# Patient Record
Sex: MALE | Race: WHITE | NOT HISPANIC OR LATINO | Employment: FULL TIME | URBAN - METROPOLITAN AREA
[De-identification: names, ages, dates, MRNs, and addresses within clinical notes are randomized per-mention and may not be internally consistent; named-entity substitution may affect disease eponyms.]

---

## 2021-04-23 DIAGNOSIS — R06.02 SHORTNESS OF BREATH: ICD-10-CM

## 2021-04-23 DIAGNOSIS — G60.0 CMT (CHARCOT-MARIE-TOOTH DISEASE): Primary | ICD-10-CM

## 2021-04-26 DIAGNOSIS — G60.0 CMT (CHARCOT-MARIE-TOOTH DISEASE): Primary | ICD-10-CM

## 2021-04-26 DIAGNOSIS — R06.02 SHORTNESS OF BREATH: ICD-10-CM

## 2021-04-28 ENCOUNTER — TELEPHONE (OUTPATIENT)
Dept: NEUROLOGY | Facility: CLINIC | Age: 41
End: 2021-04-28

## 2021-04-28 NOTE — TELEPHONE ENCOUNTER
[facility] Call Center    Phone Message    May a detailed message be left on voicemail: yes     Reason for Call: Appointment Intake    Referring Provider Name: Jefe Reyes MD   Diagnosis and/or Symptoms: CMT (Charcot-Shyanne-Tooth disease)  Shortness of breath     These diagnoses are not on our protocols for Neuro-Pulm. Please review and contact the patient to schedule.    Action Taken: Other: Neurology    Travel Screening: Not Applicable

## 2021-05-04 NOTE — TELEPHONE ENCOUNTER
Spoke with patient who reports issue with diaphragmatic weakness and SOB. Dr Saenz unavailable until August. Patient will call [facility] for possible earlier appt. Contact information provided.

## 2021-05-05 ENCOUNTER — TELEPHONE (OUTPATIENT)
Dept: NEUROLOGY | Facility: CLINIC | Age: 41
End: 2021-05-05

## 2021-05-05 NOTE — TELEPHONE ENCOUNTER
Voicemail received from [facility] requesting medical records related to referral for Jordan Flynn to see Dr. Saenz for paralyzed diaphragm.

## 2023-11-06 ENCOUNTER — MEDICAL CORRESPONDENCE (OUTPATIENT)
Dept: HEALTH INFORMATION MANAGEMENT | Facility: CLINIC | Age: 43
End: 2023-11-06
Payer: COMMERCIAL

## 2023-11-08 ENCOUNTER — TRANSCRIBE ORDERS (OUTPATIENT)
Dept: OTHER | Age: 43
End: 2023-11-08

## 2023-11-08 DIAGNOSIS — G60.0 PERONEAL MUSCULAR ATROPHY: ICD-10-CM

## 2023-11-08 DIAGNOSIS — G71.00 MUSCULAR DYSTROPHY, UNSPECIFIED (H): ICD-10-CM

## 2023-11-08 DIAGNOSIS — G62.9 NEUROPATHY: Primary | ICD-10-CM

## 2023-12-11 ENCOUNTER — OFFICE VISIT (OUTPATIENT)
Dept: NEUROLOGY | Facility: CLINIC | Age: 43
End: 2023-12-11
Attending: INTERNAL MEDICINE
Payer: COMMERCIAL

## 2023-12-11 VITALS — HEART RATE: 74 BPM | DIASTOLIC BLOOD PRESSURE: 79 MMHG | OXYGEN SATURATION: 97 % | SYSTOLIC BLOOD PRESSURE: 114 MMHG

## 2023-12-11 DIAGNOSIS — G60.0 PERONEAL MUSCULAR ATROPHY: ICD-10-CM

## 2023-12-11 DIAGNOSIS — G71.00 MUSCULAR DYSTROPHY, UNSPECIFIED (H): ICD-10-CM

## 2023-12-11 DIAGNOSIS — G60.0 CMT (CHARCOT-MARIE-TOOTH DISEASE): Primary | ICD-10-CM

## 2023-12-11 DIAGNOSIS — G62.9 NEUROPATHY: ICD-10-CM

## 2023-12-11 PROCEDURE — 99205 OFFICE O/P NEW HI 60 MIN: CPT | Performed by: STUDENT IN AN ORGANIZED HEALTH CARE EDUCATION/TRAINING PROGRAM

## 2023-12-11 RX ORDER — HYDROCHLOROTHIAZIDE 25 MG/1
1 TABLET ORAL DAILY
COMMUNITY
Start: 2023-10-23

## 2023-12-11 RX ORDER — MULTIVITAMIN WITH FOLIC ACID 400 MCG
1 TABLET ORAL DAILY
COMMUNITY
Start: 2022-09-01

## 2023-12-11 RX ORDER — FLUCONAZOLE 150 MG/1
TABLET ORAL
COMMUNITY
Start: 2023-10-04

## 2023-12-11 RX ORDER — LOSARTAN POTASSIUM 50 MG/1
1 TABLET ORAL DAILY
COMMUNITY
Start: 2023-10-04

## 2023-12-11 RX ORDER — BUSPIRONE HYDROCHLORIDE 30 MG/1
60 TABLET ORAL ONCE
COMMUNITY
Start: 2023-10-04

## 2023-12-11 NOTE — PROGRESS NOTES
CHIEF COMPLAINT / REASON FOR VISIT  Hereditary neuropathy, CMT2A.     Referred by Dr. Adame    HISTORY OF PRESENT ILLNESS   is a 43 year old male presenting [facility] for evaluation of  Hereditary neuropathy, CMT2A. He was seen in [id number] by Dr. Newell and [id number] by Dr. Reyes. Genetic test in June 2007 showed a single variant in MFN2 (c.653T>C, p.[id number]). The variant was reported as VUS in 2007, which is now classified as likely pathogenic. Similar variant was identified in his father, who had similar symptoms. He also had multiple other family members with similar neuropathy including his paternal grandmother, 4 our of 7 father's siblings, and his son.     He returns to neuromuscular clinic today for follow-up evaluation as well as to be evaluated for new electric wheelchair. The most significant change in the past 5 years was his respiratory status. He was given a diagnosis of partial diaphragm paralysis and has been using BIPAP at nighttime in the past 2 years. He has experienced significant benefit from this. He doesn't need BiPAP during the day. He continues to be wheelchair dependent 24/7.  He can transition by himself using his to lift himself up and then move sideway.  He has a conversion van and continues to be able to drive with hand control.   He continues to work full-time in [employer].  He is able to type, but has been making more mistakes lately.  He continues to have issues with pain in lower back and iliopsoas, especially with prolonged sitting in the wheelchair.  He spends 80% of his time reclining in bed and work from there.  His wheelchair elevating function has been broken multiple times this year.  He has had parts replaced and 3 repairs in the past 12 months, but still having frequent error with this function.  He is wearing compression socks to help with the swelling in the legs.  He takes gabapentin 2400 mg/day.    No issues with swallowing.  His weight has  been relatively stable.  He recently changed to bifocal glasses with improvement in his vision.    REVIEW OF SYSTEMS  All negative except for what indicated in the HPI. The following portions of the patient's history were reviewed and updated as appropriate: allergies, current medications, family history, medical history, surgical history, social history, and problem list.     PAST MEDICAL/SURGICAL HISTORY   CMT2A as stated above    MEDICATIONS    Current Outpatient Medications:     ALLOPURINOL PO, Take 100 mg by mouth daily, Disp: , Rfl:     Cholecalciferol (VITAMIN D3) 24098 UNIT CAPS, Take 1 tablet by mouth. Twice a week, Disp: , Rfl:     Cyanocobalamin (VITAMIN B 12 PO), Take 1,000 mcg by mouth , Disp: , Rfl:     DULOXETINE HCL PO, Take 20 mg by mouth daily, Disp: , Rfl:     gabapentin (NEURONTIN) 300 MG capsule, Take 300 mg by mouth 4 times daily , Disp: , Rfl:     INDOMETHACIN PO, Take 50 mg by mouth 3 times daily as needed for moderate pain, Disp: , Rfl:     lisinopril (PRINIVIL,ZESTRIL) 2.5 MG tablet, Take 1 tablet by mouth daily., Disp: , Rfl:     OMEPRAZOLE PO, Take 20 mg by mouth daily , Disp: , Rfl:     ALLERGIES:  No Known Allergies    PHYSICAL EXAM  NEUROLOGICAL EXAMINATION  Mental status: normal.  Speech: normal.  Gait: Wheelchair dependent    The Neuropathy Impairment Scoring System (NIS) strength scale was utilized.    0=normal; -1=25% weak; -2=50% weak; -3=75% weak; -3.25=movement against gravity;   -3.5=movement, gravity eliminated;-3.75=minimal contraction; -4= paralysis.  Cranial nerves   R Muscle strength L  R  L   0 Frontalis 0   Visual acuity    0 Orbicularis oculi 0  3 mm Pupils 3 mm   0 Lower facial muscles 0  0 Light reflex 0   0 Temporal-[place] 0  0 Ptosis 0   0 Palate-Pharynx 0  0 Extraocular muscles 0   0 Sternocleidomastoid 0       0 Trapezius 0   Hearing    0 Genioglossus 0              R Muscle, strength L  R Muscle, strength L    Neck     Trunk    0 Neck flexors 0   Abdominal  muscles    0 Neck extensors 0   Paraspinals     Upper Limbs    Lower Limbs     Supraspinatus   -0.5 Iliopsoas -0.5   0 Infraspinatus 0  -3 Adductors, thigh -3   0 Pectoralis 0  -1 Gluteus medius -1   0 Deltoid 0  -2 Gluteus max -2   -2 Biceps brachii -1  -4 Quadriceps -4    Brachioradialis   -3.75 Hamstrings -3.75    Supinator/pronator   -4 Tibialis anterior -4   -3 Triceps -2  -4 Peronei -4   -4 Wrist extensor -4  -4 EHL -4   -4 Wrist flexors -4  -4 Toe extensors -4   -4 Digit extensors EIP -3 the rest -4  -4 Tibialis post. -4   -4 Digit flexors -4  -4 Toe flexors -4   -3.75 Thenar -3.75  -4 Calf muscles -4   -4 Hypothenar -4       -4 Interossei -4       Atrophy of forearms and legs in proportion to degrees of weakness     R Reflexes L  R Sensation L   For NIS:  Normal=0 Reduced=1 Absent=2     -4 Biceps brachii -4   Finger index    -4 Brachioradialis -4  -1 Cold -1   -4 Triceps -4  -1 Pinprick -1   0 Deal 0  0 Vibration 0   -4 Quadriceps -4  0 Joint position 0   -4 Gastroc-soleus -4   Toes (Great)    0 Clonus (ankle) 0  -4 Cold -4   Flexor Plantar response Flexor  -4 Pinprick -4     -4 Vibration -4     -4 Joint position -4          ASSESSMENT / PLAN   #1 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 Flynn's exam today showed expected progression of CMT2A with worsening in distal muscle strength when compared to prior exam in 2012. His quality of life has been affected by frequent musculoskeletal pain in lower back, which is likely related to immobility in the setting of severe weakness and numbness in the lower limbs. I think it would be reasonable to have him reevaluated for a new wheelchair as adjustment may be needed given progression of muscle weakness. I will put in a referral for OT, PT, and electric wheelchair evaluation today.  I recommend that he restart exercising with his exercise bike more regularly.  He  may also benefit from evaluation for assistive device for typing or voice command as he is still working with computer full time.     Follow-up in 1 year or sooner if necessary.    I spent a total of 60 minutes on the day of the visit for chart review, face-to-face visit, counseling/coordination of care, and documentation. Please see the note for further information on patient assessment and treatment.       PATIENT EDUCATION  Ready to learn, no apparent learning barriers were identified; learning preferences include listening.  Explained diagnosis and treatment plan; patient expressed understanding of the content.

## 2024-01-07 ENCOUNTER — HEALTH MAINTENANCE LETTER (OUTPATIENT)
Age: 44
End: 2024-01-07

## 2024-01-16 ENCOUNTER — THERAPY VISIT (OUTPATIENT)
Dept: PHYSICAL THERAPY | Facility: CLINIC | Age: 44
End: 2024-01-16
Attending: STUDENT IN AN ORGANIZED HEALTH CARE EDUCATION/TRAINING PROGRAM
Payer: COMMERCIAL

## 2024-01-16 DIAGNOSIS — G60.0 CMT (CHARCOT-MARIE-TOOTH DISEASE): ICD-10-CM

## 2024-01-16 PROCEDURE — 97542 WHEELCHAIR MNGMENT TRAINING: CPT | Mod: GP | Performed by: PHYSICAL THERAPIST

## 2024-01-16 NOTE — PROGRESS NOTES
PHYSICAL THERAPY EVALUATION  Type of Visit: Evaluation    See electronic medical record for Abuse and Falls Screening details.    Subjective Pt has been wheelchair bound for years.  He has a genetic, hereditary condition that is progressive for weakness and functional mobility.  The current wheelchair is group 4 Permobil power wheelchair with all the power features of recline, tilt, standing, power legs, and active reach.  Pt's continue to repairs needing to be made on current power wheelchair in the past 3 years.  Pt has not  stood in 3 years because the brackets for the standing frame doesn't fit in the vehicle and makes it difficult for him to consistently remove from the chair to be able to also drive.  Pt has the need and want to begin using the standing feature again and hopes to figure out a way to still have the standing feature so he can continue to work improving bone density, bowel/bladder function, and strength.  Pt states uses the chair as the primary means of mobility.  Does have a conversion van where he uses the hand controls. Primary means of transfers is sliding from one surface to another.  Pt has recently been noticing a decline with his respiratory changes and has been diagnosed with a partially paralyzed diaphragm.  Works full time as an  from home and either lays in his bed reclined or in his chair upright.  Unable to stay for long periods of time in the power wheelchair currently because his back pain becomes more and more.  Pt has less padding and poor positioning the current chair.   Does have a sit to stand desk and so can use the standing feature to work and relieve back pain if needed.    If he is in the chair he will have to recline completely for back relief.  Went in for a pressure sore, but did not get diagnosed with one. No other concerns for pressure injuries. Diagnosed with urge incontinence, and has occasional accidents trying to get to the bathroom.      Presenting  condition or subjective complaint: Wheelchair is 8yrs old. Need one that fires better and able to support my legs comfortably and safely  Date of onset: 01/18/24    Relevant medical history: Bladder or bowel problems; Cold or hot arm or leg; Depression; Foot drop; High blood pressure; History of fractures; Mental Illness; Overweight; Pain at night or rest; Significant weakness   Dates & types of surgery: Gastric bypass 2004. Left hand tendon transfer 1988. Ankle screws installed 1985.  Prior diagnostic imaging/testing results:       Prior therapy history for the same diagnosis, illness or injury: Yes Physical on multiple dates to address leg tightness    Prior Level of Function  Transfers: Independent, sliding transfers from one surface to another.  Ambulation: Completely dependent, wheelchair bound, non-ambulatory  ADL: Assistive equipment and person  IADL: Driving, Finances, Medication management, Work    Living Environment  Social support: With a significant other or spouse   Type of home: House; 1 level   Stairs to enter the home: No       Ramp: No   Stairs inside the home: No       Help at home: Self Cares (home health aide/personal care attendant, family, etc); Home management tasks (cooking, cleaning); Medication and/or finances; Home and Yard maintenance tasks; Assist for driving and community activities  Equipment owned: Power wheelchair or scooter; Grab bars; Bath bench   Employment: Yes   Hobbies/Interests: Woodworking. Camping. R/c cars  Patient goals for therapy: Sit in my chair for an entire day    Pain assessment: Pain present  Back, legs     Objective   Height: 6'  Weight: 255 lbs  Others Present at Evaluation: JENNIFER Leon  Rehabilitation Technology Supplier/Phone Number: [employer]    CARDIO-RESPIRATORY STATUS: BiPAP  Recent or Planned Surgeries: None    Current Power WC: Age: 8 years old, : Permobil, Cushion: foam, Wheelchair Back: Solid Curved, Foam breakdown,  Footrest Style: solid plate, power elevating leg rests, Headrest: Yes padding is breaking down, Settings Used: Home, Outdoor Community Mobility, Primary Means of Transportation, Condition: Poor, Positioning Features: Anterior Tilt, Power Elevating Leg Rests, Recline Back, Seat Elevator, Standing, Tilt Seat, Power Control: Right Joystick, Current Equipment Requires: Replacement, Rationale for Equipment Changes: Poor seating and breakdown of cushions.  Required multiple repairs more frequently, 3 in the last year.  All features are not working correctly and can't use these features.    HOME ACCESSIBILITY  Primary Entrance: Level  Primary Entrance Width (inches): Large enough to fit wheelchair  All Rooms Wheelchair Accessible: Yes    COMMUNITY ADL  Transportation: Adapted Vehicle, Hand controls  Community Mobility Requirements: Medical Appointments, Shopping, Family and community events  Accessibility Requirements for Community Settings: Pt uses his wheelchair for everything and is very IND in the community when the chair is working correctly, comfortable, and safe.    Cognitive status exam  Orientation Oriented to person, place and time   Level of Consciousness Alert  Follows Commands and Answers Questions: 100% of the time  Personal Safety and Judgement: Intact  Memory: Intact  Attention: No deficits identified  Organization/Problem Solving: No deficits identified  Executive Function: No deficits identified    VISION: Wears glasses  HEARING: Normal hearing  BED MOBILITY: Min Assist, Depends on the pain that day wife will help with repositioning.  TRANSFERS: Independent, Sliding transfers from surface to surface.  BATHING: Max Assist  Equipment: Extended tub bench, Grab bar, Hand-held shower head  UPPER BODY DRESSING: Max Assist  Equipment:  Wife helps with everything  LOWER BODY DRESSING: Max Assist  Equipment:  Wife helps with everything.  TOILETING: Independent  Equipment:  Pt sits edge of the chair to urinate in  front of the toilet, slide transfer to the toilet to void bowels.  GROOMING: Max Assist  Equipment:  Wife helps  EATING/SELF FEEDING: Independent   Equipment:  Has adaptive utensils but also adapted to standard utensils.  INSTRUMENTAL ACTIVITIES OF DAILY LIVING (IADL):   Meal Planning/Prep: Dependent  Home/Financial Management: Independent  BALANCE:  Unsupported Sitting Balance: WNL  Sitting Balance in Chair: WNL  Standing Balance: Unable  AMBULATION:   Level of [place]: Dependent  Assistive Device(s): Wheelchair (power)  SLEEP/REST:  Sleep Surface/Equipment: Bed with elevating head and legs.  Does work a lot in bed in a reclined position, partially due to because wheelchair is uncomfortable to be in too long throughout the day to perform work duties in the power chair due to back pain.  Power WC Propulsion: Independent, Standard joystick operation  WC Use: Ability to Perform Weight Shifts: Assist required, needs to use seat functions to weight shift along with pressing up with arms, Bed Confined without WC: Yes, Hours in WC Daily: Becoming less due to unable to relieve back pain, not all seat features working well, and cushion breakdown.  NEUROMUSCULAR:  History of Pressure Sores:  Thought he had a pressure injury, had MD follow up but never truly diagnosed.  Current Pressure Sores: No  Able to Perform Effective Pressure Relief/Reperfusion at Seated Surface:  Assistance required, needs to use seat functions to weight shift along with pressing up with arms.  Methods of Pressure Relief: Use of Seat Functions  Methods Performed Consistently Through the Day: Yes when in the chair  COORDINATION: UE Coordination: limited for distal extremities  LE Coordination: limited motion and no ability to demonstrate coordination in the LEs.  Fine Motor Coordination: Very challenging, he does type for his job, but modified set up and uses a lot of gravity positions to move hand and wrist.  Able to control joystick with hand  however most control comes from elbow and shoulder.  TONE: Hypotonic  SENSATION:  Sensory Deficits Reported: Lack of sensation to mid shin B.  Sensation on Seating Surface: Intact per report  Head and Neck:  Head and Neck Position: WFL  Head Control: Good  Tone/Movement of Head and Neck: Normal  Upper Extremities  Shoulder Position: WFL Bilaterally, Shoulder Depressed, Shoulder Protracted  UE ROM: Demonstrates full AROM of B shoulders, limited AROM of the elbows, but full passive motion, and no active motion of the wrists and fingers uses gravity to help.  UE Strength:  5/5 MMT of shoulder motions, 3/5 elbow flexion and extension, trace for wrist and fingers.  Dominance: Right  Pelvis  Anterior/Posterior Pelvis Position: Partially Flexible, Posterior Tilt  Pelvic Obliquity: WFL  Pelvic Rotation: WFL  Trunk  Anterior/Posterior Trunk Position: Flexible, Increased Thoracic Kyphosis  Left-Right Trunk Position: WFL  Upper Trunk Rotation: Neutral  Lower Extremities  Hip Position: Abducted  Hip Position-Windswept: Neutral  LE ROM: AROM of B hips WFL, does not demonstrate active motion of the knees or ankles.  LE Strength:  3/5 MMT of B hip flexion and adduction, trace hip abduction, knees, and ankles/feet.  Foot Positioning: Everted, Plantarflexed  Edema: Wears compression stockings, has to elevate legs daily due to edema.  Wife helps with stockings.  Patient Measurements  Measurements taken by [employer].  Problems with Equipment for Mobility:  Equipment: Power wheelchair  Size: Good size  Condition: Degraded cushion integrity, multiple repairs on seat functions.  Patient Ability to Operate Equipment: No issues  Impact on Mobility: Bed bound if not able to use power wheelchair.  Problems with Skin Integrity: Becoming more concerning due to the cushion integrity and the material on the cushion and back rest.  Impact on ADL/IADL: Unable to use the standing features and recline/tilt features for pressure relieving  positions.  Postural Support: Needs improved hip support and upper back support to allow for improved positioning and pressure relief.    Assessment & Plan   CLINICAL IMPRESSIONS  Medical Diagnosis: CMT (Charcot-Shyanne-Tooth disease) (G60.0)  - Primary    Treatment Diagnosis: Progressive neurological weakness and decline in function.   Impression/Assessment:  Pt is a 43 year old male with progressive neurological weakness of lower and upper extremities secondary to Charcot-Shyanne-Tooth disease.  Pt has been wheelchair bound for years.  He works full time from home and has modifications to help him continue to do so, however unable to sit in the wheelchair for long periods of time.  Pt has an adaptable van with hand controls so he can drive while in the power wheelchair, however due to position of chair and features he cannot use the leg blocks for the standing feature due to getting in the way of the vehicle and chair position.  Pt will benefit from a new replacement group 4 power wheelchair with power elevating legs to manage edema, recline and tilt for self pressure relieving positioning, standing feature to help build and maintain bone density and  strength, and seat elevator to level himself to various surface heights for ease of sliding transfers to toilet, bed, chair, etc.    Clinical Decision Making (Complexity):  Clinical Presentation: Stable/Uncomplicated  Clinical Presentation Rationale: based on medical and personal factors listed in PT evaluation  Clinical Decision Making (Complexity): Moderate complexity    PLAN OF CARE  Treatment Interventions:  Interventions: Wheelchair Management/Training    Long Term Goals      Patient/family demonstrates understanding of equipment to reduce risk to patient/caregiver during ADL.  Patient/family demonstrates understanding of equipment for independent mobility, including benefits/limitations.  Patient/family demontrastes understanding of equipment for  reducing/accommodating postural deviations, including benefits/limitations.  Patient to demonstrate a successful clinical trial of the recommended wheelchair.  Patient to demonstrate a successful home trial with the recommended equipment.        Frequency of Treatment: 1 time visit  Duration of Treatment: 1 time visit    Education Assessment:   Learner/Method: Patient;Listening;No Barriers to Learning    Risks and benefits of evaluation/treatment have been explained.   Patient/Family/caregiver agrees with Plan of Care.     Evaluation Time:        Wheelchair Mgmt/Training Minutes (01694): 75     Signing Clinician: Marguerite Maria, PT      [facility]                                                                                   OUTPATIENT PHYSICAL THERAPY      PLAN OF TREATMENT FOR OUTPATIENT REHABILITATION   Patient's Last Name, First Name, Gio Wahlrichard  VIVEK YOB: 1980   Provider's Name   [facility]   Medical Record No.  [id number]     Onset Date: 01/18/24  Start of Care Date: 01/16/24     Medical Diagnosis:  CMT (Charcot-Shyanne-Tooth disease) (G60.0)  - Primary      PT Treatment Diagnosis:  Progressive neurological weakness and decline in function. Plan of Treatment  Frequency/Duration: 1 time visit/ 1 time visit    Certification date from 01/16/24 to 01/16/24         See note for plan of treatment details and functional goals     Marguerite Maria, PT                         I CERTIFY THE NEED FOR THESE SERVICES FURNISHED UNDER        THIS PLAN OF TREATMENT AND WHILE UNDER MY CARE     (Physician attestation of this document indicates review and certification of the therapy plan).              Referring Provider:  Nicolasa Monroy    Initial Assessment  See Epic Evaluation- Start of Care Date: 01/16/24

## 2024-02-07 NOTE — PROGRESS NOTES
REQUISITION AND JUSTIFICATION FOR DURABLE MEDICAL EQUIPMENT    Patient Name:  Jordan Flynn  MR #:  [id number]  :  1980  Age/Gender:  43 year old male  Visit Date:  Jordan Flynn seen for seating and wheeled mobility evaluation by Marguerite Maria, PT, DPT and ATP from [facility] on 2024.    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 Specifically affected are toileting (being able to get there in time to prevent accidents), dressing, and bathing (getting into the bathroom of designated place). Current equipment used is group 4 power wheelchair with multiple seat functions that he uses to be independent with ADLs and MRADLs. This patient needs the new equipment requested to be able to be more independent and utilize all the features of the chair in order to be more functional.  Current chair is requiring multiple and frequent repairs that is becoming very costly. Please see additional documentation in the seating and wheeled mobility report for details.   Jordan had a successful clinical trial here, and also a successful trial at home with the recommended equipment. Jordan is very willing and physically / cognitively able to use the recommended equipment to assist his with mobility-related activities of daily living and general mobility. A group 4 power wheelchair is being requested because it has better suspension for a smooth ride and has the capabilities of expandable electronics to operate the  power seat functions Jordan needs for independence with his activities of daily living. A Group 3 power wheelchair does not have sufficient electronics to support this patient's progressive neurological deficits and patient  already has a group 4 power wheelchair, reducing to a group 3 or 2 would not benefit patient.  B) Jordan's mobility limitation cannot be sufficiently and safely resolved by the use of an appropriately fitted cane or walker because he is non-ambulatory and has been for years. TUG results not applicably as he does not ambulate.  Strength of legs is 3/5 for bilateral hip flexion and adduction, trace hip abduction, knees, and ankles/feet. Jordan does not demonstrate active motion of the knees and ankles bilaterally.    C) Jordan does not have sufficient upper extremity function to self-propel an optimally-configured manual wheelchair in his home to perform MRADLs during a typical day due to limitations in strength, endurance, range of motion, and coordination. Distance and time to propel a light weight manual wheelchair not appropriate and applicable secondary to Jordan not having any active motion or strength in wrists and forearms.  Strength of arms is 5/5 for bilateral shoulder motions, 3/5 for bilateral elbow flexion and extension, transfer for wrist and finger/hand motions.  Jordan uses gravity to help position wrists/hands in correct position to grab onto joystick and place on surfaces for transfers.  D)  Jordan is not able to use a POV/scooter because it will not fit in his home environment. Jordan is unable to safely transfer to and from a POV, unable to operate the [employer]er steering system, and unable to maintain postural stability and position while operating the POV. Jordan needs more appropriate seating and positioning than any scooter seat provides.  E) The need for this equipment is LIFETIME.     RECOMMENDATIONS FOR MOBILITY BASE, SEATING SYSTEM AND COMPONENTS  Permobil F5 Base [facility] - Group 4 Power wheelchair base. Group 4 power wheelchair will be required to perform multiple seating functions such as standing, tilt, seat elevation, recline and power elevating  legrests. Lesser models will not be able to achieve multiple seating functions he requires. Group 4 Power wheelchair will help assist with Jordan's ADL's, such as brushing teeth, cooking, reaching items from cupboard. Group 4 power wheelchair comes with upgraded suspension, and high speed/high torque motors, which are required to navigate Jordan's environment.   Power Tilt And Power Recline - Patient requires 50 degrees of tilt to provide pressure distribution from sacrum region. Recline will be required due to Jordan is unable to sit at 90 degree's because of increasing pain. Jordan must reposition back angle to manage pain and improve comfort. Recline will also be utilized for repositioning in power wheelchair as Jordan does not have the upper extremity strength to reposition himself with a wheelchair push up.  Tilt and Recline will also be required to properly stand power wheelchair.   Power Standing Function- Standing function of power wheelchair will allow Jordan to independently stand while utilizing power wheelchair. Standing feature will provide pressure relief, range of motion of the lower extremities, help maintain bone density and other benefits to him while standing such as helping assist completing ADL's throughout the day.   Power Adjustable Seat Elevator- Seat Elevator will help assist with Jordan with ADL's such as grooming, brushing teeth and personal cares.  As well as assisting with uneven transfers since he performs all transfers with a slide pivot technique.  Having a more even surface to surface transfer will reduce risk of falling and pressure injuries.   Batteries 24 Group- Will allow power to the wheelchair, required to operate power wheelchair.  Corpus Back Support- 18  wide positioning back support. Will provide proper positioning and support for Jordan while utilizing power wheelchair.  Slimline Retractable Joystick Mount- Retractable mount will  allow Jordan to position joystick out of the way for safety with transfers.  Adjustable Height Panel Bracket- Allows Joystick to be positioned to the correct height   Expandable Controller- Required electronics to be able to perform seating functions such as tilt, recline, power elevating legs, seat elevation. Without this function the seating function will not work.  Harness for Expandable Controller- Wire harness is required to operate seating function on power wheelchair.  Multiple Seat Function Control Kit- Multiple seat function control kit allows Jordan to directly access his multiple power seat functions. Required to be able to access seating functions while not interrupting driving and operating of power wheelchair.   Chest Support Medium- Chest support will provide trunk positioning for Jordan while standing in power wheelchair.  Adjustable Chest Support Hardware- Required to attach chest support to power wheelchair.   Left and Right Armrest Pouch- Will allow Jordan to transport and access medical supplies, cell phone while in the chair as he does not have strength and active motion in hands and wrists.  Corpus VS Power Legrest Elevation- Jordan is able to independently operate the power elevating leg rest. Power elevating leg rests are required to manage lower extremity edema.   1-Piece Foot Rest- Will allow proper positioning of feet on foot plates. Also will allow Jordan to flip up foot rest.   Knee Supports- Lower extremity positioning device, will help assist Jordan's lower extremities to maintain a neutral position as he does not have much active motions and strength in the legs.   Knee support Hardware- Required to attach knee supports to power wheelchair base.   BodiLink Adjustable Thigh Support Hardware- required to attach thigh support to wheelchair base.  BodiLink Thigh/Hip Supports - Hip and Thigh supports necessary to achieve a more neutral position.    Transfer Handles- Will help assist Jordan with transfers in and out of power wheelchair as he performs a sliding pivot transfer from surface to surface.   Angle Adjustable Tray 15 - Upper extremity support will provide positioning while in power wheelchair.  Adjustable Tray Hardware- Required to attach tray onto power wheelchair.   Permobil 10  Head Support- Headrest will provide support and positioning for Jordan's head.  BodiLink Head Support Hardware- This will attach headrest to backrest  Roho Quadro Select Cushion- positioning and skin protection cushion. Jordan requires a positioning cushion to maintain a neutral pelvic position. He is also at high risk of skin breakdown and risk a proper skin protection cushion. Jordan does not have the strength in the upper and lower extremities to independently reposition himself.  A cushion that will protect him from skin breakdown will allow him to utilize the chair and reposition himself with the seat functions.  Heavy Duty Cushion Cover- Will provide durability to cushion to help prevent air loss in cushion.   Body Point StayFlex Chest Harness- Upper body support for Jordan while he is driving and seated in the chair for long periods of time.  Jordan's core will fatigue quickly and this will help maintain his position in the chair. Will help assist Jordan's trunk to maintain a neutral position while driving power wheelchair.     I have read and concur with the above recommendations.    Electronically signed by:  Marguerite Maria, PT, DPT     [facility]  [phone]     fax: [phone]     [email or web address]  [facility]  [address] [place] [id number]  2/7/2024     Physician Printed Name __________________________________________    Physician SIgnature  _____________________________________________    Date of SIgnature ______________________________    Physician Phone   ______________________________

## 2024-10-18 ENCOUNTER — OFFICE VISIT (OUTPATIENT)
Dept: NEUROLOGY | Facility: CLINIC | Age: 44
End: 2024-10-18
Payer: COMMERCIAL

## 2024-10-18 ENCOUNTER — LAB (OUTPATIENT)
Dept: LAB | Facility: CLINIC | Age: 44
End: 2024-10-18
Payer: COMMERCIAL

## 2024-10-18 VITALS
HEART RATE: 73 BPM | SYSTOLIC BLOOD PRESSURE: 128 MMHG | RESPIRATION RATE: 16 BRPM | DIASTOLIC BLOOD PRESSURE: 85 MMHG | OXYGEN SATURATION: 96 %

## 2024-10-18 DIAGNOSIS — E53.1 PYRIDOXINE DEFICIENCY: ICD-10-CM

## 2024-10-18 DIAGNOSIS — G60.0 CMT (CHARCOT-MARIE-TOOTH DISEASE): Primary | ICD-10-CM

## 2024-10-18 DIAGNOSIS — G60.0 CMT (CHARCOT-MARIE-TOOTH DISEASE): ICD-10-CM

## 2024-10-18 LAB
EST. AVERAGE GLUCOSE BLD GHB EST-MCNC: 120 MG/DL
HBA1C MFR BLD: 5.8 %
TSH SERPL DL<=0.005 MIU/L-ACNC: 2.24 UIU/ML (ref 0.3–4.2)
VIT B12 SERPL-MCNC: 499 PG/ML (ref 232–1245)

## 2024-10-18 PROCEDURE — 36415 COLL VENOUS BLD VENIPUNCTURE: CPT | Performed by: PATHOLOGY

## 2024-10-18 PROCEDURE — 83036 HEMOGLOBIN GLYCOSYLATED A1C: CPT | Performed by: STUDENT IN AN ORGANIZED HEALTH CARE EDUCATION/TRAINING PROGRAM

## 2024-10-18 PROCEDURE — 82607 VITAMIN B-12: CPT | Performed by: STUDENT IN AN ORGANIZED HEALTH CARE EDUCATION/TRAINING PROGRAM

## 2024-10-18 PROCEDURE — 99215 OFFICE O/P EST HI 40 MIN: CPT | Performed by: STUDENT IN AN ORGANIZED HEALTH CARE EDUCATION/TRAINING PROGRAM

## 2024-10-18 PROCEDURE — 84425 ASSAY OF VITAMIN B-1: CPT | Mod: 90 | Performed by: PATHOLOGY

## 2024-10-18 PROCEDURE — 84207 ASSAY OF VITAMIN B-6: CPT | Mod: 90 | Performed by: PATHOLOGY

## 2024-10-18 PROCEDURE — 99000 SPECIMEN HANDLING OFFICE-LAB: CPT | Performed by: PATHOLOGY

## 2024-10-18 PROCEDURE — 84443 ASSAY THYROID STIM HORMONE: CPT | Performed by: PATHOLOGY

## 2024-10-18 ASSESSMENT — PAIN SCALES - GENERAL: PAINLEVEL: MODERATE PAIN (4)

## 2024-10-18 NOTE — LETTER
10/18/2024       RE: Jordan Flynn  [address] [facility] [address]     Dear Colleague,    Thank you for referring your patient, Jordan Flynn, to the [facility] [place] at [facility]. Please see a copy of my visit note below.    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 He was seen in [id number] by Dr. Newell and [id number] by Dr. Reyes. Genetic test in June 2007 showed a single variant in MFN2 (c.653T>C, p.[id number]). The variant was reported as VUS in 2007, which is now classified as likely pathogenic. Similar variant was identified in his father, who had similar symptoms. He also had multiple other family members with similar neuropathy including his paternal grandmother, 4 our of 7 father's siblings, and his son.     He returns to neuromuscular clinic today for follow-up evaluation.  He got new electric wheelchair since last visit, which has been working well.  The new wheelchair partially help the lower back and iliopsoas pain but these continue to bother him.  He has been seeing chiropractor and feels that putting pressure on these muscles have provided benefit.  He did not find physical therapy helpful.  He recently purchased elastic exercise band but has not started using them yet.  He feels that weakness in the hands and legs have been stable. He continues to be wheelchair dependent 24/7.  He can transition by himself using his to lift himself up and then move sideway.  He has a conversion van and continues to be able to drive with hand control.   He continues to work full-time in [employer].  He is able to type, but has been making more mistakes lately.  Continues to use BiPAP at night without  any issues.  He is wearing compression socks to help with the swelling in the legs.  He takes gabapentin 2400 mg/day.    No issues with swallowing.  His weight has been relatively stable.      REVIEW OF SYSTEMS  All negative except for what indicated in the HPI. The following portions of the patient's history were reviewed and updated as appropriate: allergies, current medications, family history, medical history, surgical history, social history, and problem list.     PAST MEDICAL/SURGICAL HISTORY   CMT2A as stated above    PHYSICAL EXAM  NEUROLOGICAL EXAMINATION  Mental status: normal.  Speech: normal.  Gait: Wheelchair dependent    The Neuropathy Impairment Scoring System (NIS) strength scale was utilized.    0=normal; -1=25% weak; -2=50% weak; -3=75% weak; -3.25=movement against gravity;   -3.5=movement, gravity eliminated;-3.75=minimal contraction; -4= paralysis.  Cranial nerves   R Muscle strength L  R  L   0 Frontalis 0   Visual acuity    0 Orbicularis oculi 0  3 mm Pupils 3 mm   0 Lower facial muscles 0  0 Light reflex 0   0 Temporal-Masseter 0  0 Ptosis 0   0 Palate-Pharynx 0  0 Extraocular muscles 0   0 Sternocleidomastoid 0       0 Trapezius 0   Hearing    0 Genioglossus 0              R Muscle, strength L  R Muscle, strength L    Neck     Trunk    0 Neck flexors 0   Abdominal muscles    0 Neck extensors 0   Paraspinals     Upper Limbs    Lower Limbs     Supraspinatus   0 Iliopsoas 0   0 Infraspinatus 0  -3 Adductors, thigh -3   0 Pectoralis 0  -1 Gluteus medius -1   0 Deltoid 0  -2 Gluteus max -2   -2 Biceps brachii 0  -4 Quadriceps -4    Brachioradialis   -4 Hamstrings -4    Supinator/pronator   -4 Tibialis anterior -4   0 Triceps 0  -4 Peronei -4   -4 Wrist extensor -4  -4 EHL -4   -4 Wrist flexors -4  -4 Toe extensors -4   -4 Digit extensors EIP -3 the rest -4  -4 Tibialis post. -4   -4 Digit flexors -4  -4 Toe flexors -4   -3.75 Thenar -3.75  -4 Calf muscles -4   -4 Hypothenar -4       -4 Interossei  -4       Atrophy of forearms and legs in proportion to degrees of weakness     R Reflexes L  R Sensation L   For NIS:  Normal=0 Reduced=1 Absent=2     -4 Biceps brachii -4   Finger index    -4 Brachioradialis -4  -1 Cold -1   -4 Triceps -4  -1 Pinprick -1   0 Deal 0  0 Vibration 0   -4 Quadriceps -4  0 Joint position 0   -4 Gastroc-soleus -4   Toes (Great)    0 Clonus (ankle) 0  -4 Cold -4   Flexor Plantar response Flexor  -4 Pinprick -4     -4 Vibration -4     -4 Joint position -4          ASSESSMENT / PLAN   #1 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 Flynn's exam today is stable from last visit. He continues to struggle with musculoskeletal pain in lower back, which is likely related to immobility in the setting of severe weakness and numbness in the lower limbs. I recommend preventative range of motion and stretching exercises. I will also refer him to PM&R expert to see if he would benefit from local injections. We again discuss the importance of exercising regularly with his exercise bike.     - PM&R referral for musculoskeletal pain in hip and lower back.   - Labs for vitamin B1, 6, 12, TSH, HbA1c today.     Follow-up in 1 year or sooner if necessary.    I spent a total of 40 minutes on the day of the visit for chart review, face-to-face visit, counseling/coordination of care, and documentation. Please see the note for further information on patient assessment and treatment.       PATIENT EDUCATION  Ready to learn, no apparent learning barriers were identified; learning preferences include listening.  Explained diagnosis and treatment plan; patient expressed understanding of the content.      Again, thank you for allowing me to participate in the care of your patient.      Sincerely,    Nicolasa Monroy MD

## 2024-10-18 NOTE — PROGRESS NOTES
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 He was seen in [id number] by Dr. Newell and [id number] by Dr. Reyes. Genetic test in June 2007 showed a single variant in MFN2 (c.653T>C, p.[id number]). The variant was reported as VUS in 2007, which is now classified as likely pathogenic. Similar variant was identified in his father, who had similar symptoms. He also had multiple other family members with similar neuropathy including his paternal grandmother, 4 our of 7 father's siblings, and his son.     He returns to neuromuscular clinic today for follow-up evaluation.  He got new electric wheelchair since last visit, which has been working well.  The new wheelchair partially help the lower back and iliopsoas pain but these continue to bother him.  He has been seeing chiropractor and feels that putting pressure on these muscles have provided benefit.  He did not find physical therapy helpful.  He recently purchased elastic exercise band but has not started using them yet.  He feels that weakness in the hands and legs have been stable. He continues to be wheelchair dependent 24/7.  He can transition by himself using his to lift himself up and then move sideway.  He has a conversion van and continues to be able to drive with hand control.   He continues to work full-time in accounting.  He is able to type, but has been making more mistakes lately.  Continues to use BiPAP at night without any issues.  He is wearing compression socks to help with the swelling in the legs.  He takes gabapentin 2400 mg/day.    No issues with swallowing.  His weight has been relatively stable.      REVIEW OF SYSTEMS  All negative except for what indicated in the HPI. The following portions of the patient's history were reviewed and  updated as appropriate: allergies, current medications, family history, medical history, surgical history, social history, and problem list.     PAST MEDICAL/SURGICAL HISTORY   CMT2A as stated above    PHYSICAL EXAM  NEUROLOGICAL EXAMINATION  Mental status: normal.  Speech: normal.  Gait: Wheelchair dependent    The Neuropathy Impairment Scoring System (NIS) strength scale was utilized.    0=normal; -1=25% weak; -2=50% weak; -3=75% weak; -3.25=movement against gravity;   -3.5=movement, gravity eliminated;-3.75=minimal contraction; -4= paralysis.  Cranial nerves   R Muscle strength L  R  L   0 Frontalis 0   Visual acuity    0 Orbicularis oculi 0  3 mm Pupils 3 mm   0 Lower facial muscles 0  0 Light reflex 0   0 Temporal-Masseter 0  0 Ptosis 0   0 Palate-Pharynx 0  0 Extraocular muscles 0   0 Sternocleidomastoid 0       0 Trapezius 0   Hearing    0 Genioglossus 0              R Muscle, strength L  R Muscle, strength L    Neck     Trunk    0 Neck flexors 0   Abdominal muscles    0 Neck extensors 0   Paraspinals     Upper Limbs    Lower Limbs     Supraspinatus   0 Iliopsoas 0   0 Infraspinatus 0  -3 Adductors, thigh -3   0 Pectoralis 0  -1 Gluteus medius -1   0 Deltoid 0  -2 Gluteus max -2   -2 Biceps brachii 0  -4 Quadriceps -4    Brachioradialis   -4 Hamstrings -4    Supinator/pronator   -4 Tibialis anterior -4   0 Triceps 0  -4 Peronei -4   -4 Wrist extensor -4  -4 EHL -4   -4 Wrist flexors -4  -4 Toe extensors -4   -4 Digit extensors EIP -3 the rest -4  -4 Tibialis post. -4   -4 Digit flexors -4  -4 Toe flexors -4   -3.75 Thenar -3.75  -4 Calf muscles -4   -4 Hypothenar -4       -4 Interossei -4       Atrophy of forearms and legs in proportion to degrees of weakness     R Reflexes L  R Sensation L   For NIS:  Normal=0 Reduced=1 Absent=2     -4 Biceps brachii -4   Finger index    -4 Brachioradialis -4  -1 Cold -1   -4 Triceps -4  -1 Pinprick -1   0 Deal 0  0 Vibration 0   -4 Quadriceps -4  0 Joint position 0   -4  Gastroc-soleus -4   Toes (Great)    0 Clonus (ankle) 0  -4 Cold -4   Flexor Plantar response Flexor  -4 Pinprick -4     -4 Vibration -4     -4 Joint position -4          ASSESSMENT / PLAN   #1 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 Flynn's exam today is stable from last visit. He continues to struggle with musculoskeletal pain in lower back, which is likely related to immobility in the setting of severe weakness and numbness in the lower limbs. I recommend preventative range of motion and stretching exercises. I will also refer him to PM&R expert to see if he would benefit from local injections. We again discuss the importance of exercising regularly with his exercise bike.     - PM&R referral for musculoskeletal pain in hip and lower back.   - Labs for vitamin B1, 6, 12, TSH, HbA1c today.     Follow-up in 1 year or sooner if necessary.    I spent a total of 40 minutes on the day of the visit for chart review, face-to-face visit, counseling/coordination of care, and documentation. Please see the note for further information on patient assessment and treatment.       PATIENT EDUCATION  Ready to learn, no apparent learning barriers were identified; learning preferences include listening.  Explained diagnosis and treatment plan; patient expressed understanding of the content.    ADDENDUM  -Vitamin B6 came back low at 12.4 ()  -Vitamin B1, B12, TSH were unremarkable.  -Hemoglobin A1c is 5.8%    I recommend starting vitamin B6 supplement 25 mg daily for 2 months then have the level rechecked.  Results and plans were discussed with patient through [employer] messages.

## 2024-10-21 ENCOUNTER — TELEPHONE (OUTPATIENT)
Dept: NEUROLOGY | Facility: CLINIC | Age: 44
End: 2024-10-21
Payer: COMMERCIAL

## 2024-10-21 LAB — PYRIDOXAL PHOS SERPL-SCNC: 12.4 NMOL/L

## 2024-10-21 NOTE — LETTER
October 21, 2024      Jordan Flynn  [address] [place] [address]        To Whom It May Concern:    Jordan Flynn is a patient I follow at the [facility]. Mr. Flynn carries a diagnosis of Charcot-Shyanne-Tooth. His clinic exam performed on 10/18/24 was stable. Therefore, he should be able to continue to drive with hand controls. Please contact my office if you have any questions.      Sincerely,      Nicolasa Monroy MD  [facility]  [address] [place] [address]  Phone: [phone]  Fax: [phone]

## 2024-10-22 LAB — VIT B1 PYROPHOSHATE BLD-SCNC: 135 NMOL/L

## 2024-10-22 RX ORDER — PYRIDOXINE HCL (VITAMIN B6) 25 MG
25 TABLET ORAL DAILY
Qty: 60 TABLET | Refills: 0 | Status: SHIPPED | OUTPATIENT
Start: 2024-10-22

## 2024-10-22 NOTE — TELEPHONE ENCOUNTER
REASON FOR VISIT: CMT (Charcot-Shyanne-Tooth disease) [G60.0]CMT2A with reduced ROM and myalgia in lower back and hip girdle    DATE OF APPT: 11/20/2024   NOTES (FOR ALL VISITS) STATUS DETAILS   OFFICE NOTE from referring provider [facility]  Nicolasa Monroy MD 10/18/2024   MEDICATION LIST N/A    IMAGING  (FOR ALL VISITS)     XR N/A    MRI (HEAD, NECK, SPINE) N/A    CT (HEAD, NECK, SPINE) N/A

## 2024-11-06 NOTE — PROGRESS NOTES
"    SUBJECTIVE:  HPI:  Jordan Flynn  Is a 44 year old male who presents for new patient evaluation of hip and low back pain upon referral from Dr. 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  He got new electric wheelchair since last visit, which has been working well.  The new wheelchair partially help the lower back and iliopsoas pain but these continue to bother him.  He has been seeing chiropractor and feels that putting pressure on these muscles have provided benefit.  He did not find physical therapy helpful.  He recently purchased elastic exercise band but has not started using them yet.  He feels that weakness in the hands and legs have been stable. He continues to be wheelchair dependent 24/7.  He can transition by himself using his to lift himself up and then move sideway.  He has a conversion van and continues to be able to drive with hand control.   He continues to work full-time in [employer].  He is able to type, but has been making more mistakes lately.  Continues to use BiPAP at night without any issues.  He is wearing compression socks to help with the swelling in the legs.  He takes gabapentin 2400 mg/day. ...  ...Mr. Flynn's exam today is stable from last visit. He continues to struggle with musculoskeletal pain in lower back, which is likely related to immobility in the setting of severe weakness and numbness in the lower limbs. I recommend preventative range of motion and stretching exercises. I will also refer him to PM&R expert to see if he would benefit from local injections. We again discuss the importance of exercising regularly with his exercise bike. \"    History 11/20/24:    Jordan is here with his wife Gracy assisting with the history.  He has had pain in his bilateral buttock area and also in his " "bilateral anterior proximal thighs for a couple of years.  He has learned some stretching from physical therapy and they advised lying down 5 minutes an hour which is not realistic in his situation.  He went to chiropractor who did a release techniques using his lower extremities and that practitioner told him the problem was in his iliopsoas musculature.  At one of the sessions he was on his side and his IT band \"popped\" by his knee and that caused an increase of pain for a few weeks which then resolved he indicates on my back the bilateral SI areas.  He has significant weakness in distal upper and lower extremities with his CMT.  Sitting makes it worse and standing and laying down makes his pain better.        Pain score and diagram reviewed.  See questionnaire.        See the patient's intake questionnaire from today for details.        MEDICATIONS:  Reviewed.    ALLERGIES:  Reviewed.     PAST MEDICAL/SURGICAL HISTORY:   Pertinent for hereditary neuropathy, Charcot-Shyanen-Tooth      OBJECTIVE:    IMAGING: Images and reports reviewed        THREE VIEWS OF THE LUMBAR SPINE 2/11/2012    FINDINGS:  Five lumbar type vertebral bodies are assumed for this  dictation. There is spondylolysis defects at L5 with grade 1  anterolisthesis of L5 on S1. Loss of joint space at L5-S1. Irregularity of  the inferior sacrum with rightward curvature, may be from old trauma.    Impression     Grade 1 spondylolisthesis of L5 on S1.    EXAMINATION:    --CONSTITUTIONAL:   No acute distress.  The patient is well nourished and well groomed.  He is in a elaborate sit/stand mechanical wheelchair.  Elevated BMI.  He however was able to transfer safely from the wheelchair onto my exam table in a supine position.  I did not do a neurologic exam as it is clear he has profound weakness of both upper and lower distal extremities.    In a seated position, lumbar extension and facet loading did not reproduce his pain.    SI joint provocation tests " bilaterally negative for pelvic compression, pelvic distraction, thigh thrust, and LALY.  Therefore I did not do the Gaenslen's.    ASSESSMENT: Jordan Flynn is a 44 year old male who presents  today for new patient evaluation of:    CMT: Wheelchair dependent  Chronic low back pain  Negative SI joint provocation test  Negative facet loading      PLAN:  It is really difficult to ascertain a pain generator that would be a high value target for injections.  I am not sure that imaging would really add much to this picture.  Problem is that he is wheelchair-bound and has to sit on a regular basis.  It is almost impossible to determine if there is any radicular symptoms but that does not seem to be the issue based on his pain diagram and symptoms.  I am afraid I do not have a lot I can add to his care.    I would like to thank Dr. Nicolasa Monroy for the opportunity to participate in the care of this patient.  I did mention that Dr. Otoniel Wynn runs a CMT clinic and I would defer to his treating neurologist to determine if Jordan is an appropriate candidate for that clinic.    Advised patient to call or return early if symptoms worsen, or having problems controlling bladder and bowel function or worsening leg weakness.     Please note: Voice recognition software was used in this dictation.  It may therefore contain typographical errors.    Bharath Quan MD

## 2024-11-20 ENCOUNTER — OFFICE VISIT (OUTPATIENT)
Dept: NEUROSURGERY | Facility: CLINIC | Age: 44
End: 2024-11-20
Attending: STUDENT IN AN ORGANIZED HEALTH CARE EDUCATION/TRAINING PROGRAM
Payer: COMMERCIAL

## 2024-11-20 ENCOUNTER — PRE VISIT (OUTPATIENT)
Dept: NEUROSURGERY | Facility: CLINIC | Age: 44
End: 2024-11-20

## 2024-11-20 VITALS
HEART RATE: 81 BPM | WEIGHT: 260 LBS | HEIGHT: 72 IN | DIASTOLIC BLOOD PRESSURE: 75 MMHG | BODY MASS INDEX: 35.21 KG/M2 | SYSTOLIC BLOOD PRESSURE: 113 MMHG

## 2024-11-20 DIAGNOSIS — G89.29 CHRONIC BILATERAL LOW BACK PAIN WITHOUT SCIATICA: Primary | ICD-10-CM

## 2024-11-20 DIAGNOSIS — G60.0 CMT (CHARCOT-MARIE-TOOTH DISEASE): ICD-10-CM

## 2024-11-20 DIAGNOSIS — M54.50 CHRONIC BILATERAL LOW BACK PAIN WITHOUT SCIATICA: Primary | ICD-10-CM

## 2024-11-20 ASSESSMENT — PAIN SCALES - GENERAL: PAINLEVEL_OUTOF10: MODERATE PAIN (4)

## 2024-11-20 NOTE — NURSING NOTE
Reason For Visit:   Chief Complaint   Patient presents with    Consult     Low back and hip Pain         Occupation:   Currently working? Yes.  Work status?  Full time.    Sports: n  Activities: n             /75   Pulse 81   Ht 1.829 m (6')   Wt 117.9 kg (260 lb)   BMI 35.26 kg/m        No Known Allergies    Current Outpatient Medications   Medication Sig Dispense Refill    ALLOPURINOL PO Take 100 mg by mouth daily      busPIRone HCl (BUSPAR) 30 MG tablet Take 60 mg by mouth once      Cholecalciferol (VITAMIN D3) 17723 UNIT CAPS Take 1 tablet by mouth. Twice a week      Cyanocobalamin (VITAMIN B 12 PO) Take 1,000 mcg by mouth       fluconazole (DIFLUCAN) 150 MG tablet Take 1 tablet every other day for 3 doses as needed for recurring yeast infections.      gabapentin (NEURONTIN) 300 MG capsule Take 300 mg by mouth 4 times daily       hydrochlorothiazide (HYDRODIURIL) 25 MG tablet Take 1 tablet by mouth daily      losartan (COZAAR) 50 MG tablet Take 1 tablet by mouth daily      Multiple Vitamin (TAB-A-MARÍA) TABS Take 1 tablet by mouth daily      OMEPRAZOLE PO Take 20 mg by mouth daily 3x per week      pyridOXINE (VITAMIN B6) 25 MG tablet Take 1 tablet (25 mg) by mouth daily. 60 tablet 0    sertraline (ZOLOFT) 50 MG tablet Take 1 tablet by mouth 2 times daily. Limited prescription, due for follow up office visit. Please schedule an appointment.      DULOXETINE HCL PO Take 20 mg by mouth daily      INDOMETHACIN PO Take 50 mg by mouth 3 times daily as needed for moderate pain (Patient not taking: Reported on 12/11/2023)      lisinopril (PRINIVIL,ZESTRIL) 2.5 MG tablet Take 1 tablet by mouth daily. (Patient not taking: Reported on 12/11/2023)       No current facility-administered medications for this visit.         Darla Severin-Brown, LPN

## 2024-11-20 NOTE — LETTER
"11/20/2024      Jordan Flynn  [address] [place] [facility] [address]      Dear Colleague,    Thank you for referring your patient, Jordan Flynn, to the [facility] [place]. Please see a copy of my visit note below.        SUBJECTIVE:  HPI:  Jordan Flynn  Is a 44 year old male who presents for new patient evaluation of hip and low back pain upon referral from Dr. 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  He got new electric wheelchair since last visit, which has been working well.  The new wheelchair partially help the lower back and iliopsoas pain but these continue to bother him.  He has been seeing chiropractor and feels that putting pressure on these muscles have provided benefit.  He did not find physical therapy helpful.  He recently purchased elastic exercise band but has not started using them yet.  He feels that weakness in the hands and legs have been stable. He continues to be wheelchair dependent 24/7.  He can transition by himself using his to lift himself up and then move sideway.  He has a conversion van and continues to be able to drive with hand control.   He continues to work full-time in [employer].  He is able to type, but has been making more mistakes lately.  Continues to use BiPAP at night without any issues.  He is wearing compression socks to help with the swelling in the legs.  He takes gabapentin 2400 mg/day. ...  ...Mr. Flynn's exam today is stable from last visit. He continues to struggle with musculoskeletal pain in lower back, which is likely related to immobility in the setting of severe weakness and numbness in the lower limbs. I recommend preventative range of motion and stretching exercises. I will also refer him to PM&R expert to see if he would benefit " "from local injections. We again discuss the importance of exercising regularly with his exercise bike. \"    History 11/20/24:    Jordan is here with his wife Gracy assisting with the history.  He has had pain in his bilateral buttock area and also in his bilateral anterior proximal thighs for a couple of years.  He has learned some stretching from physical therapy and they advised lying down 5 minutes an hour which is not realistic in his situation.  He went to chiropractor who did a release techniques using his lower extremities and that practitioner told him the problem was in his iliopsoas musculature.  At one of the sessions he was on his side and his IT band \"popped\" by his knee and that caused an increase of pain for a few weeks which then resolved he indicates on my back the bilateral SI areas.  He has significant weakness in distal upper and lower extremities with his CMT.  Sitting makes it worse and standing and laying down makes his pain better.        Pain score and diagram reviewed.  See questionnaire.        See the patient's intake questionnaire from today for details.        MEDICATIONS:  Reviewed.    ALLERGIES:  Reviewed.     PAST MEDICAL/SURGICAL HISTORY:   Pertinent for hereditary neuropathy, Charcot-Shyanne-Tooth      OBJECTIVE:    IMAGING: Images and reports reviewed        THREE VIEWS OF THE LUMBAR SPINE 2/11/2012    FINDINGS:  Five lumbar type vertebral bodies are assumed for this  dictation. There is spondylolysis defects at L5 with grade 1  anterolisthesis of L5 on S1. Loss of joint space at L5-S1. Irregularity of  the inferior sacrum with rightward curvature, may be from old trauma.    Impression     Grade 1 spondylolisthesis of L5 on S1.    EXAMINATION:    --CONSTITUTIONAL:   No acute distress.  The patient is well nourished and well groomed.  He is in a elaborate sit/stand mechanical wheelchair.  Elevated BMI.  He however was able to transfer safely from the wheelchair onto my exam " table in a supine position.  I did not do a neurologic exam as it is clear he has profound weakness of both upper and lower distal extremities.    In a seated position, lumbar extension and facet loading did not reproduce his pain.    SI joint provocation tests bilaterally negative for pelvic compression, pelvic distraction, thigh thrust, and LALY.  Therefore I did not do the Gaenslen's.    ASSESSMENT: Jordan Flynn is a 44 year old male who presents  today for new patient evaluation of:    CMT: Wheelchair dependent  Chronic low back pain  Negative SI joint provocation test  Negative facet loading      PLAN:  It is really difficult to ascertain a pain generator that would be a high value target for injections.  I am not sure that imaging would really add much to this picture.  Problem is that he is wheelchair-bound and has to sit on a regular basis.  It is almost impossible to determine if there is any radicular symptoms but that does not seem to be the issue based on his pain diagram and symptoms.  I am afraid I do not have a lot I can add to his care.    I would like to thank Dr. Nicolasa Monroy for the opportunity to participate in the care of this patient.  I did mention that Dr. Otoniel Wynn runs a CMT clinic and I would defer to his treating neurologist to determine if Jordan is an appropriate candidate for that clinic.    Advised patient to call or return early if symptoms worsen, or having problems controlling bladder and bowel function or worsening leg weakness.     Please note: Voice recognition software was used in this dictation.  It may therefore contain typographical errors.    Bharath Quan MD             Again, thank you for allowing me to participate in the care of your patient.        Sincerely,        Bharath Quan MD

## 2024-11-20 NOTE — PATIENT INSTRUCTIONS
Jordan, it was a pleasure to meet you and Gracy.  I wish I had a better solution for your chronic back issues.  See the assessment and plan below for further details of our discussion today and I wish you all the best    ASSESSMENT: Jordan Flynn is a 44 year old male who presents  today for new patient evaluation of:    CMT: Wheelchair dependent  Chronic low back pain  Negative SI joint provocation test  Negative facet loading      PLAN:  It is really difficult to ascertain a pain generator that would be a high value target for injections.  I am not sure that imaging would really add much to this picture.  Problem is that he is wheelchair-bound and has to sit on a regular basis.  It is almost impossible to determine if there is any radicular symptoms but that does not seem to be the issue based on his pain diagram and symptoms.  I am afraid I do not have a lot I can add to his care.

## 2025-01-25 ENCOUNTER — HEALTH MAINTENANCE LETTER (OUTPATIENT)
Age: 45
End: 2025-01-25

## 2025-02-11 DIAGNOSIS — G60.0 CMT (CHARCOT-MARIE-TOOTH DISEASE): ICD-10-CM

## 2025-02-11 DIAGNOSIS — E53.1 PYRIDOXINE DEFICIENCY: ICD-10-CM

## 2025-02-11 RX ORDER — PYRIDOXINE HCL (VITAMIN B6) 25 MG
25 TABLET ORAL DAILY
Qty: 90 TABLET | Refills: 0 | Status: SHIPPED | OUTPATIENT
Start: 2025-02-11

## 2025-06-26 ENCOUNTER — TELEPHONE (OUTPATIENT)
Dept: NEUROLOGY | Facility: CLINIC | Age: 45
End: 2025-06-26
Payer: COMMERCIAL

## 2025-06-26 NOTE — TELEPHONE ENCOUNTER
Sent Mychart (1st Attempt) and Patient Contacted for the patient to call back and schedule the following:    Appointment type: Return MD  Provider: Dr. Monroy  Return date: next available  Specialty phone number: [phone]  Additional appointment(s) needed: NA  Additonal Notes: Reschedule    The pt will call back

## 2025-07-03 ENCOUNTER — TELEPHONE (OUTPATIENT)
Dept: NEUROLOGY | Facility: CLINIC | Age: 45
End: 2025-07-03
Payer: COMMERCIAL

## 2025-07-08 ENCOUNTER — TELEPHONE (OUTPATIENT)
Dept: NEUROLOGY | Facility: CLINIC | Age: 45
End: 2025-07-08
Payer: COMMERCIAL

## 2025-07-08 NOTE — TELEPHONE ENCOUNTER
Left Voicemail (1st Attempt) and Sent Mychart (1st Attempt) for the patient to call back and schedule the following:    Appointment type: Return MD  Provider: Dr. Monroy  Return date: next available and wait list  Specialty phone number: [phone]  Additional appointment(s) needed: NA  Additonal Notes:     Per the RN's, we are not able to schedule with another provider for a sooner appt.